# Patient Record
Sex: MALE | Race: WHITE | NOT HISPANIC OR LATINO | URBAN - METROPOLITAN AREA
[De-identification: names, ages, dates, MRNs, and addresses within clinical notes are randomized per-mention and may not be internally consistent; named-entity substitution may affect disease eponyms.]

---

## 2017-06-29 ENCOUNTER — OFFICE VISIT - HEALTHEAST (OUTPATIENT)
Dept: FAMILY MEDICINE | Facility: CLINIC | Age: 37
End: 2017-06-29

## 2017-06-29 DIAGNOSIS — R50.9 FEVER: ICD-10-CM

## 2017-06-29 DIAGNOSIS — J18.9 RML PNEUMONIA: ICD-10-CM

## 2017-06-29 DIAGNOSIS — R07.81 PLEURITIC PAIN: ICD-10-CM

## 2017-06-29 RX ORDER — AZITHROMYCIN 250 MG/1
TABLET, FILM COATED ORAL
Qty: 6 TABLET | Refills: 0 | Status: SHIPPED | OUTPATIENT
Start: 2017-06-29

## 2017-06-29 ASSESSMENT — MIFFLIN-ST. JEOR: SCORE: 2040.25

## 2021-05-31 VITALS — HEIGHT: 71 IN | BODY MASS INDEX: 34.51 KG/M2 | WEIGHT: 246.5 LBS

## 2021-06-11 NOTE — PROGRESS NOTES
Assessment/Plan:     1. Fever  - XR Chest PA and Lateral    2. Pleuritic pain    - XR Chest PA and Lateral    3. RML pneumonia    I suspect that his symptoms are coming from mild right middle lobe community-acquired pneumonia.  Will treat with azithromycin for 5 day course.  Discussed common side effects.  Notify with persistence of fever, left upper quadrant pain, or onset of additional symptoms.  He is comfortable with this plan.      Subjective:      Damon Huitron is a 37 y.o. male presented clinic today for evaluation of a few different concerns.  He has been having significant chills and shivers along with sense of fever, dizziness and weakness, and generalized aching in his joints and muscles for the past 2 days.  This interfered with sleep last night.  Improvement today after taking ibuprofen.  He has not had any known exposures.  He works driving a truck and making deliveries, no specific exposures known but multiple potential exposures.  He denies any nausea, vomiting, or diarrhea.  Denies cough or sore throat.  No nasal congestion.  No rashes.  About a week ago developed some left upper quadrant pain was noticeable but very mild.  He had 15 minutes of significantly worsening pain without any identifiable triggers, worsened again with a sneeze, and a few days ago experienced a more intense episode of pain.  Notes that taking deep breath seems to make it a little bit worse.  Does not seem to change with eating, bowel movement, urination, etc.  No known exposures in that regard.  Does not seem to get worse with lifting heavy items for work.    Current Outpatient Prescriptions   Medication Sig Dispense Refill     azithromycin (ZITHROMAX) 250 MG tablet Take 2 tabs by mouth on day one, then one tab by mouth daily days two through five 6 tablet 0     No current facility-administered medications for this visit.        Past Medical History, Family History, and Social History reviewed.  No past medical history on  "file.  No past surgical history on file.  Penicillins  Family History   Problem Relation Age of Onset     No Medical Problems Mother      No Medical Problems Father      Social History     Social History     Marital status: Single     Spouse name: N/A     Number of children: N/A     Years of education: N/A     Occupational History     Not on file.     Social History Main Topics     Smoking status: Former Smoker     Smokeless tobacco: Not on file     Alcohol use 9.6 oz/week     16 Cans of beer per week     Drug use: No     Sexual activity: Yes     Partners: Female      Comment:       Other Topics Concern     Not on file     Social History Narrative         Review of systems is as stated in HPI, and the remainder of the 10 system review is otherwise negative.    Objective:     Vitals:    06/29/17 1036   BP: 122/70   Patient Site: Right Arm   Patient Position: Sitting   Cuff Size: Adult Large   Pulse: 68   Resp: 20   Temp: 98.2  F (36.8  C)   TempSrc: Oral   Weight: (!) 246 lb 8 oz (111.8 kg)   Height: 5' 11\" (1.803 m)    Body mass index is 34.38 kg/(m^2).    Alert male.  Pupils are equal, round, reactive to light.  Tympanic membrane spelling translucent.  Oropharynx is clear.  Neck supple without lymphadenopathy.  Heart with regular rate and rhythm.  Lungs are clear and well aerated throughout.  Abdomen is soft and nontender, no palpable abnormalities in left upper quadrant, no elicitable pain on exam.  Extremities without edema.    I ordered personally reviewed a 2 view chest x-ray.  There is some haziness and infiltrate obscuring the right cardiac border consistent with right middle lobe infiltrate.      This note has been dictated using voice recognition software. Any grammatical or context distortions are unintentional and inherent to the the software.   "